# Patient Record
Sex: MALE | Race: WHITE | Employment: OTHER | URBAN - METROPOLITAN AREA
[De-identification: names, ages, dates, MRNs, and addresses within clinical notes are randomized per-mention and may not be internally consistent; named-entity substitution may affect disease eponyms.]

---

## 2024-04-06 ENCOUNTER — HOSPITAL ENCOUNTER (EMERGENCY)
Age: 74
Discharge: HOME | End: 2024-04-06
Attending: EMERGENCY MEDICINE
Payer: OTHER GOVERNMENT

## 2024-04-06 ENCOUNTER — APPOINTMENT (OUTPATIENT)
Dept: CT IMAGING | Age: 74
End: 2024-04-06
Payer: OTHER GOVERNMENT

## 2024-04-06 VITALS
OXYGEN SATURATION: 99 % | RESPIRATION RATE: 18 BRPM | TEMPERATURE: 97.5 F | DIASTOLIC BLOOD PRESSURE: 72 MMHG | SYSTOLIC BLOOD PRESSURE: 114 MMHG | HEART RATE: 89 BPM

## 2024-04-06 DIAGNOSIS — S09.90XA INJURY OF HEAD, INITIAL ENCOUNTER: Primary | ICD-10-CM

## 2024-04-06 PROCEDURE — 72125 CT NECK SPINE W/O DYE: CPT

## 2024-04-06 PROCEDURE — 70450 CT HEAD/BRAIN W/O DYE: CPT

## 2024-04-06 PROCEDURE — 90471 IMMUNIZATION ADMIN: CPT | Performed by: EMERGENCY MEDICINE

## 2024-04-06 PROCEDURE — 99284 EMERGENCY DEPT VISIT MOD MDM: CPT

## 2024-04-06 PROCEDURE — 90714 TD VACC NO PRESV 7 YRS+ IM: CPT | Performed by: EMERGENCY MEDICINE

## 2024-04-06 PROCEDURE — 6360000002 HC RX W HCPCS: Performed by: EMERGENCY MEDICINE

## 2024-04-06 RX ORDER — TETANUS AND DIPHTHERIA TOXOIDS ADSORBED 2; 2 [LF]/.5ML; [LF]/.5ML
0.5 INJECTION INTRAMUSCULAR ONCE
Status: COMPLETED | OUTPATIENT
Start: 2024-04-06 | End: 2024-04-06

## 2024-04-06 RX ADMIN — TETANUS AND DIPHTHERIA TOXOIDS ADSORBED 0.5 ML: 2; 2 INJECTION INTRAMUSCULAR at 12:32

## 2024-04-06 ASSESSMENT — PAIN SCALES - GENERAL: PAINLEVEL_OUTOF10: 3

## 2024-04-06 ASSESSMENT — PAIN - FUNCTIONAL ASSESSMENT: PAIN_FUNCTIONAL_ASSESSMENT: 0-10

## 2024-04-06 NOTE — ED PROVIDER NOTES
73-year-old male presenting with concern about a laceration of the back of his head.  He was carrying something in his house yesterday evening he reports.  He is not sure about what time it was.  He was carrying a glass of water and 1 hand and started to turn a corner, lost his balance and fell.  Struck the back of his head against an item of furniture.  Does have dried blood across laceration now, denied losing consciousness.  He is currently awake, alert, oriented x 4 not in any distress         History reviewed. No pertinent family history.  Past Surgical History:   Procedure Laterality Date    APPENDECTOMY      EYE SURGERY      bilateral cataracts    FOOT SURGERY      1-4-2013 mpj left foot    FRACTURE SURGERY      elbow       Review of Systems   HENT:          Laceration/abrasion to the back of the head   Skin:  Positive for wound.        Physical Exam  Constitutional:       General: He is not in acute distress.     Appearance: He is well-developed.   HENT:      Head: Normocephalic.      Comments: V-shaped laceration on the back of the left side of the occiput  Eyes:      Pupils: Pupils are equal, round, and reactive to light.   Neck:      Thyroid: No thyromegaly.   Cardiovascular:      Rate and Rhythm: Normal rate and regular rhythm.   Pulmonary:      Effort: Pulmonary effort is normal. No respiratory distress.      Breath sounds: Normal breath sounds. No wheezing.   Abdominal:      General: There is no distension.      Palpations: Abdomen is soft. There is no mass.      Tenderness: There is no abdominal tenderness. There is no guarding or rebound.   Musculoskeletal:         General: No tenderness. Normal range of motion.      Cervical back: Normal range of motion and neck supple.   Skin:     General: Skin is warm and dry.      Findings: No erythema.   Neurological:      Mental Status: He is alert and oriented to person, place, and time.      Cranial Nerves: No cranial nerve deficit.   Psychiatric:

## 2024-09-05 ENCOUNTER — HOSPITAL ENCOUNTER (OUTPATIENT)
Age: 74
Setting detail: OBSERVATION
Discharge: NURSING, REHAB OR LONG-TERM CARE | End: 2024-09-11
Attending: EMERGENCY MEDICINE | Admitting: INTERNAL MEDICINE
Payer: OTHER GOVERNMENT

## 2024-09-05 ENCOUNTER — APPOINTMENT (OUTPATIENT)
Dept: CT IMAGING | Age: 74
End: 2024-09-05
Payer: OTHER GOVERNMENT

## 2024-09-05 ENCOUNTER — APPOINTMENT (OUTPATIENT)
Dept: GENERAL RADIOLOGY | Age: 74
End: 2024-09-05
Payer: OTHER GOVERNMENT

## 2024-09-05 DIAGNOSIS — R62.7 FAILURE TO THRIVE IN ADULT: Primary | ICD-10-CM

## 2024-09-05 DIAGNOSIS — R29.6 FREQUENT FALLS: ICD-10-CM

## 2024-09-05 DIAGNOSIS — R53.83 OTHER FATIGUE: ICD-10-CM

## 2024-09-05 LAB
ALBUMIN SERPL-MCNC: 4.1 G/DL (ref 3.5–5.2)
ALP SERPL-CCNC: 307 U/L (ref 40–129)
ALT SERPL-CCNC: 66 U/L (ref 0–40)
ANION GAP SERPL CALCULATED.3IONS-SCNC: 12 MMOL/L (ref 7–16)
AST SERPL-CCNC: 67 U/L (ref 0–39)
B.E.: -2.3 MMOL/L (ref -3–3)
BASOPHILS # BLD: 0.03 K/UL (ref 0–0.2)
BASOPHILS NFR BLD: 1 % (ref 0–2)
BILIRUB SERPL-MCNC: 1.1 MG/DL (ref 0–1.2)
BILIRUB UR QL STRIP: NEGATIVE
BUN SERPL-MCNC: 23 MG/DL (ref 6–23)
CALCIUM SERPL-MCNC: 10.8 MG/DL (ref 8.6–10.2)
CHLORIDE SERPL-SCNC: 108 MMOL/L (ref 98–107)
CK SERPL-CCNC: 86 U/L (ref 20–200)
CLARITY UR: CLEAR
CO2 SERPL-SCNC: 25 MMOL/L (ref 22–29)
COHB: 0.5 % (ref 0–1.5)
COLOR UR: YELLOW
CREAT SERPL-MCNC: 1.6 MG/DL (ref 0.7–1.2)
CRITICAL: ABNORMAL
DATE ANALYZED: ABNORMAL
DATE OF COLLECTION: ABNORMAL
EOSINOPHIL # BLD: 0.14 K/UL (ref 0.05–0.5)
EOSINOPHILS RELATIVE PERCENT: 3 % (ref 0–6)
ERYTHROCYTE [DISTWIDTH] IN BLOOD BY AUTOMATED COUNT: 12.8 % (ref 11.5–15)
GFR, ESTIMATED: 44 ML/MIN/1.73M2
GLUCOSE BLD-MCNC: 110 MG/DL (ref 74–99)
GLUCOSE BLD-MCNC: 89 MG/DL (ref 74–99)
GLUCOSE SERPL-MCNC: 101 MG/DL (ref 74–99)
GLUCOSE UR STRIP-MCNC: NEGATIVE MG/DL
HCO3: 20.5 MMOL/L (ref 22–26)
HCT VFR BLD AUTO: 38.4 % (ref 37–54)
HGB BLD-MCNC: 13 G/DL (ref 12.5–16.5)
HGB UR QL STRIP.AUTO: NEGATIVE
HHB: 2.8 % (ref 0–5)
IMM GRANULOCYTES # BLD AUTO: 0.03 K/UL (ref 0–0.58)
IMM GRANULOCYTES NFR BLD: 1 % (ref 0–5)
KETONES UR STRIP-MCNC: NEGATIVE MG/DL
LAB: ABNORMAL
LEUKOCYTE ESTERASE UR QL STRIP: NEGATIVE
LYMPHOCYTES NFR BLD: 1.03 K/UL (ref 1.5–4)
LYMPHOCYTES RELATIVE PERCENT: 20 % (ref 20–42)
Lab: 1416
MCH RBC QN AUTO: 31 PG (ref 26–35)
MCHC RBC AUTO-ENTMCNC: 33.9 G/DL (ref 32–34.5)
MCV RBC AUTO: 91.6 FL (ref 80–99.9)
METHB: 0.1 % (ref 0–1.5)
MODE: ABNORMAL
MONOCYTES NFR BLD: 0.43 K/UL (ref 0.1–0.95)
MONOCYTES NFR BLD: 8 % (ref 2–12)
NEUTROPHILS NFR BLD: 68 % (ref 43–80)
NEUTS SEG NFR BLD: 3.48 K/UL (ref 1.8–7.3)
NITRITE UR QL STRIP: NEGATIVE
O2 CONTENT: 18.1 ML/DL
O2 SATURATION: 97.2 % (ref 92–98.5)
O2HB: 96.6 % (ref 94–97)
OPERATOR ID: 1821
PATIENT TEMP: 37 C
PCO2: 29.9 MMHG (ref 35–45)
PH BLOOD GAS: 7.46 (ref 7.35–7.45)
PH UR STRIP: 6 [PH] (ref 5–9)
PLATELET # BLD AUTO: 139 K/UL (ref 130–450)
PMV BLD AUTO: 12.2 FL (ref 7–12)
PO2: 88 MMHG (ref 75–100)
POTASSIUM SERPL-SCNC: 5.2 MMOL/L (ref 3.5–5)
PROT SERPL-MCNC: 7.4 G/DL (ref 6.4–8.3)
PROT UR STRIP-MCNC: 100 MG/DL
RBC # BLD AUTO: 4.19 M/UL (ref 3.8–5.8)
RBC #/AREA URNS HPF: ABNORMAL /HPF
SODIUM SERPL-SCNC: 145 MMOL/L (ref 132–146)
SOURCE, BLOOD GAS: ABNORMAL
SP GR UR STRIP: >1.03 (ref 1–1.03)
THB: 13.3 G/DL (ref 11.5–16.5)
TIME ANALYZED: 1427
UROBILINOGEN UR STRIP-ACNC: 0.2 EU/DL (ref 0–1)
WBC #/AREA URNS HPF: ABNORMAL /HPF
WBC OTHER # BLD: 5.1 K/UL (ref 4.5–11.5)

## 2024-09-05 PROCEDURE — 72125 CT NECK SPINE W/O DYE: CPT

## 2024-09-05 PROCEDURE — 82962 GLUCOSE BLOOD TEST: CPT

## 2024-09-05 PROCEDURE — G0378 HOSPITAL OBSERVATION PER HR: HCPCS

## 2024-09-05 PROCEDURE — 70450 CT HEAD/BRAIN W/O DYE: CPT

## 2024-09-05 PROCEDURE — 93005 ELECTROCARDIOGRAM TRACING: CPT | Performed by: EMERGENCY MEDICINE

## 2024-09-05 PROCEDURE — 6360000002 HC RX W HCPCS

## 2024-09-05 PROCEDURE — 36415 COLL VENOUS BLD VENIPUNCTURE: CPT

## 2024-09-05 PROCEDURE — 85025 COMPLETE CBC W/AUTO DIFF WBC: CPT

## 2024-09-05 PROCEDURE — 81001 URINALYSIS AUTO W/SCOPE: CPT

## 2024-09-05 PROCEDURE — 82550 ASSAY OF CK (CPK): CPT

## 2024-09-05 PROCEDURE — 99285 EMERGENCY DEPT VISIT HI MDM: CPT

## 2024-09-05 PROCEDURE — 80053 COMPREHEN METABOLIC PANEL: CPT

## 2024-09-05 PROCEDURE — 6370000000 HC RX 637 (ALT 250 FOR IP): Performed by: EMERGENCY MEDICINE

## 2024-09-05 PROCEDURE — 71046 X-RAY EXAM CHEST 2 VIEWS: CPT

## 2024-09-05 PROCEDURE — 82805 BLOOD GASES W/O2 SATURATION: CPT

## 2024-09-05 RX ORDER — PROCHLORPERAZINE EDISYLATE 5 MG/ML
10 INJECTION INTRAMUSCULAR; INTRAVENOUS EVERY 6 HOURS PRN
Status: DISCONTINUED | OUTPATIENT
Start: 2024-09-05 | End: 2024-09-07

## 2024-09-05 RX ORDER — INSULIN LISPRO 100 [IU]/ML
0-8 INJECTION, SOLUTION INTRAVENOUS; SUBCUTANEOUS
Status: DISCONTINUED | OUTPATIENT
Start: 2024-09-05 | End: 2024-09-09

## 2024-09-05 RX ORDER — ACETAMINOPHEN 325 MG/1
650 TABLET ORAL EVERY 6 HOURS PRN
Status: DISCONTINUED | OUTPATIENT
Start: 2024-09-05 | End: 2024-09-11 | Stop reason: HOSPADM

## 2024-09-05 RX ORDER — DEXTROSE MONOHYDRATE 100 MG/ML
INJECTION, SOLUTION INTRAVENOUS CONTINUOUS PRN
Status: DISCONTINUED | OUTPATIENT
Start: 2024-09-05 | End: 2024-09-11 | Stop reason: HOSPADM

## 2024-09-05 RX ORDER — HYDRALAZINE HYDROCHLORIDE 20 MG/ML
10 INJECTION INTRAMUSCULAR; INTRAVENOUS EVERY 4 HOURS PRN
Status: DISCONTINUED | OUTPATIENT
Start: 2024-09-05 | End: 2024-09-11 | Stop reason: HOSPADM

## 2024-09-05 RX ORDER — GLUCAGON 1 MG/ML
1 KIT INJECTION PRN
Status: DISCONTINUED | OUTPATIENT
Start: 2024-09-05 | End: 2024-09-11 | Stop reason: HOSPADM

## 2024-09-05 RX ORDER — LABETALOL HYDROCHLORIDE 5 MG/ML
10 INJECTION, SOLUTION INTRAVENOUS EVERY 4 HOURS PRN
Status: DISCONTINUED | OUTPATIENT
Start: 2024-09-05 | End: 2024-09-11 | Stop reason: HOSPADM

## 2024-09-05 RX ORDER — MECOBALAMIN 5000 MCG
5 TABLET,DISINTEGRATING ORAL NIGHTLY PRN
Status: DISCONTINUED | OUTPATIENT
Start: 2024-09-05 | End: 2024-09-11 | Stop reason: HOSPADM

## 2024-09-05 RX ORDER — PANTOPRAZOLE SODIUM 40 MG/1
40 TABLET, DELAYED RELEASE ORAL
Status: DISCONTINUED | OUTPATIENT
Start: 2024-09-06 | End: 2024-09-11 | Stop reason: HOSPADM

## 2024-09-05 RX ORDER — LISINOPRIL 10 MG/1
40 TABLET ORAL ONCE
Status: COMPLETED | OUTPATIENT
Start: 2024-09-05 | End: 2024-09-05

## 2024-09-05 RX ORDER — POTASSIUM CHLORIDE 7.45 MG/ML
10 INJECTION INTRAVENOUS PRN
Status: DISCONTINUED | OUTPATIENT
Start: 2024-09-05 | End: 2024-09-11 | Stop reason: HOSPADM

## 2024-09-05 RX ORDER — INSULIN LISPRO 100 [IU]/ML
0-4 INJECTION, SOLUTION INTRAVENOUS; SUBCUTANEOUS NIGHTLY
Status: DISCONTINUED | OUTPATIENT
Start: 2024-09-05 | End: 2024-09-09

## 2024-09-05 RX ORDER — POTASSIUM CHLORIDE 1500 MG/1
40 TABLET, EXTENDED RELEASE ORAL PRN
Status: DISCONTINUED | OUTPATIENT
Start: 2024-09-05 | End: 2024-09-11 | Stop reason: HOSPADM

## 2024-09-05 RX ORDER — MAGNESIUM SULFATE IN WATER 40 MG/ML
2000 INJECTION, SOLUTION INTRAVENOUS PRN
Status: DISCONTINUED | OUTPATIENT
Start: 2024-09-05 | End: 2024-09-11 | Stop reason: HOSPADM

## 2024-09-05 RX ADMIN — LISINOPRIL 40 MG: 10 TABLET ORAL at 18:50

## 2024-09-05 RX ADMIN — LABETALOL HYDROCHLORIDE 10 MG: 5 INJECTION INTRAVENOUS at 20:28

## 2024-09-05 ASSESSMENT — LIFESTYLE VARIABLES
HOW OFTEN DO YOU HAVE A DRINK CONTAINING ALCOHOL: NEVER
HOW MANY STANDARD DRINKS CONTAINING ALCOHOL DO YOU HAVE ON A TYPICAL DAY: PATIENT DOES NOT DRINK

## 2024-09-06 LAB
25(OH)D3 SERPL-MCNC: 13.7 NG/ML (ref 30–100)
ALBUMIN SERPL-MCNC: 3.8 G/DL (ref 3.5–5.2)
ALP SERPL-CCNC: 298 U/L (ref 40–129)
ALT SERPL-CCNC: 61 U/L (ref 0–40)
ANION GAP SERPL CALCULATED.3IONS-SCNC: 11 MMOL/L (ref 7–16)
AST SERPL-CCNC: 52 U/L (ref 0–39)
BASOPHILS # BLD: 0.05 K/UL (ref 0–0.2)
BASOPHILS NFR BLD: 1 % (ref 0–2)
BILIRUB SERPL-MCNC: 0.9 MG/DL (ref 0–1.2)
BUN SERPL-MCNC: 25 MG/DL (ref 6–23)
CALCIUM SERPL-MCNC: 10 MG/DL (ref 8.6–10.2)
CHLORIDE SERPL-SCNC: 104 MMOL/L (ref 98–107)
CHOLEST SERPL-MCNC: 273 MG/DL
CO2 SERPL-SCNC: 24 MMOL/L (ref 22–29)
CREAT SERPL-MCNC: 1.6 MG/DL (ref 0.7–1.2)
EOSINOPHIL # BLD: 0.2 K/UL (ref 0.05–0.5)
EOSINOPHILS RELATIVE PERCENT: 3 % (ref 0–6)
ERYTHROCYTE [DISTWIDTH] IN BLOOD BY AUTOMATED COUNT: 13 % (ref 11.5–15)
FOLATE SERPL-MCNC: 8.1 NG/ML (ref 4.8–24.2)
GFR, ESTIMATED: 44 ML/MIN/1.73M2
GLUCOSE BLD-MCNC: 105 MG/DL (ref 74–99)
GLUCOSE BLD-MCNC: 110 MG/DL (ref 74–99)
GLUCOSE BLD-MCNC: 87 MG/DL (ref 74–99)
GLUCOSE BLD-MCNC: 97 MG/DL (ref 74–99)
GLUCOSE SERPL-MCNC: 142 MG/DL (ref 74–99)
HBA1C MFR BLD: 5.4 % (ref 4–5.6)
HCT VFR BLD AUTO: 36.2 % (ref 37–54)
HDLC SERPL-MCNC: 33 MG/DL
HGB BLD-MCNC: 12.2 G/DL (ref 12.5–16.5)
IMM GRANULOCYTES # BLD AUTO: <0.03 K/UL (ref 0–0.58)
IMM GRANULOCYTES NFR BLD: 0 % (ref 0–5)
IRON SATN MFR SERPL: 16 % (ref 20–55)
IRON SERPL-MCNC: 56 UG/DL (ref 59–158)
LDLC SERPL CALC-MCNC: 193 MG/DL
LYMPHOCYTES NFR BLD: 1.61 K/UL (ref 1.5–4)
LYMPHOCYTES RELATIVE PERCENT: 27 % (ref 20–42)
MCH RBC QN AUTO: 31.4 PG (ref 26–35)
MCHC RBC AUTO-ENTMCNC: 33.7 G/DL (ref 32–34.5)
MCV RBC AUTO: 93.1 FL (ref 80–99.9)
MONOCYTES NFR BLD: 0.46 K/UL (ref 0.1–0.95)
MONOCYTES NFR BLD: 8 % (ref 2–12)
NEUTROPHILS NFR BLD: 60 % (ref 43–80)
NEUTS SEG NFR BLD: 3.54 K/UL (ref 1.8–7.3)
PLATELET # BLD AUTO: 146 K/UL (ref 130–450)
PMV BLD AUTO: 12.1 FL (ref 7–12)
POTASSIUM SERPL-SCNC: 3.8 MMOL/L (ref 3.5–5)
PROT SERPL-MCNC: 6.6 G/DL (ref 6.4–8.3)
PSA SERPL-MCNC: 67.75 NG/ML (ref 0–4)
RBC # BLD AUTO: 3.89 M/UL (ref 3.8–5.8)
SODIUM SERPL-SCNC: 139 MMOL/L (ref 132–146)
T4 FREE SERPL-MCNC: 1.3 NG/DL (ref 0.9–1.7)
TIBC SERPL-MCNC: 346 UG/DL (ref 250–450)
TRIGL SERPL-MCNC: 237 MG/DL
TROPONIN I SERPL HS-MCNC: 26 NG/L (ref 0–11)
TSH SERPL DL<=0.05 MIU/L-ACNC: 1.73 UIU/ML (ref 0.27–4.2)
VIT B12 SERPL-MCNC: 324 PG/ML (ref 211–946)
VLDLC SERPL CALC-MCNC: 47 MG/DL
WBC OTHER # BLD: 5.9 K/UL (ref 4.5–11.5)

## 2024-09-06 PROCEDURE — 83036 HEMOGLOBIN GLYCOSYLATED A1C: CPT

## 2024-09-06 PROCEDURE — 6360000002 HC RX W HCPCS

## 2024-09-06 PROCEDURE — 84439 ASSAY OF FREE THYROXINE: CPT

## 2024-09-06 PROCEDURE — 97161 PT EVAL LOW COMPLEX 20 MIN: CPT

## 2024-09-06 PROCEDURE — 6370000000 HC RX 637 (ALT 250 FOR IP): Performed by: INTERNAL MEDICINE

## 2024-09-06 PROCEDURE — 84484 ASSAY OF TROPONIN QUANT: CPT

## 2024-09-06 PROCEDURE — G0103 PSA SCREENING: HCPCS

## 2024-09-06 PROCEDURE — 82962 GLUCOSE BLOOD TEST: CPT

## 2024-09-06 PROCEDURE — 82306 VITAMIN D 25 HYDROXY: CPT

## 2024-09-06 PROCEDURE — 82746 ASSAY OF FOLIC ACID SERUM: CPT

## 2024-09-06 PROCEDURE — 6360000002 HC RX W HCPCS: Performed by: INTERNAL MEDICINE

## 2024-09-06 PROCEDURE — 80053 COMPREHEN METABOLIC PANEL: CPT

## 2024-09-06 PROCEDURE — 83550 IRON BINDING TEST: CPT

## 2024-09-06 PROCEDURE — 82607 VITAMIN B-12: CPT

## 2024-09-06 PROCEDURE — G0378 HOSPITAL OBSERVATION PER HR: HCPCS

## 2024-09-06 PROCEDURE — 36415 COLL VENOUS BLD VENIPUNCTURE: CPT

## 2024-09-06 PROCEDURE — 85025 COMPLETE CBC W/AUTO DIFF WBC: CPT

## 2024-09-06 PROCEDURE — 83540 ASSAY OF IRON: CPT

## 2024-09-06 PROCEDURE — 84443 ASSAY THYROID STIM HORMONE: CPT

## 2024-09-06 PROCEDURE — 80061 LIPID PANEL: CPT

## 2024-09-06 RX ORDER — AMLODIPINE BESYLATE 5 MG/1
5 TABLET ORAL DAILY
Status: DISCONTINUED | OUTPATIENT
Start: 2024-09-06 | End: 2024-09-07

## 2024-09-06 RX ORDER — SODIUM CHLORIDE AND POTASSIUM CHLORIDE 150; 900 MG/100ML; MG/100ML
INJECTION, SOLUTION INTRAVENOUS CONTINUOUS
Status: DISPENSED | OUTPATIENT
Start: 2024-09-06 | End: 2024-09-07

## 2024-09-06 RX ADMIN — HYDRALAZINE HYDROCHLORIDE 10 MG: 20 INJECTION INTRAMUSCULAR; INTRAVENOUS at 19:40

## 2024-09-06 RX ADMIN — AMLODIPINE BESYLATE 5 MG: 5 TABLET ORAL at 09:20

## 2024-09-06 RX ADMIN — POTASSIUM CHLORIDE AND SODIUM CHLORIDE: 900; 150 INJECTION, SOLUTION INTRAVENOUS at 09:19

## 2024-09-06 RX ADMIN — POTASSIUM CHLORIDE AND SODIUM CHLORIDE: 900; 150 INJECTION, SOLUTION INTRAVENOUS at 19:42

## 2024-09-07 LAB
ALBUMIN SERPL-MCNC: 3.6 G/DL (ref 3.5–5.2)
ALP SERPL-CCNC: 294 U/L (ref 40–129)
ALT SERPL-CCNC: 54 U/L (ref 0–40)
ANION GAP SERPL CALCULATED.3IONS-SCNC: 11 MMOL/L (ref 7–16)
AST SERPL-CCNC: 47 U/L (ref 0–39)
BASOPHILS # BLD: 0.05 K/UL (ref 0–0.2)
BASOPHILS NFR BLD: 1 % (ref 0–2)
BILIRUB SERPL-MCNC: 0.8 MG/DL (ref 0–1.2)
BUN SERPL-MCNC: 16 MG/DL (ref 6–23)
CALCIUM SERPL-MCNC: 9.8 MG/DL (ref 8.6–10.2)
CHLORIDE SERPL-SCNC: 108 MMOL/L (ref 98–107)
CO2 SERPL-SCNC: 23 MMOL/L (ref 22–29)
CREAT SERPL-MCNC: 1.4 MG/DL (ref 0.7–1.2)
EKG ATRIAL RATE: 67 BPM
EKG P AXIS: 23 DEGREES
EKG P-R INTERVAL: 136 MS
EKG Q-T INTERVAL: 396 MS
EKG QRS DURATION: 84 MS
EKG QTC CALCULATION (BAZETT): 418 MS
EKG T AXIS: -42 DEGREES
EKG VENTRICULAR RATE: 67 BPM
EOSINOPHIL # BLD: 0.31 K/UL (ref 0.05–0.5)
EOSINOPHILS RELATIVE PERCENT: 6 % (ref 0–6)
ERYTHROCYTE [DISTWIDTH] IN BLOOD BY AUTOMATED COUNT: 12.9 % (ref 11.5–15)
GFR, ESTIMATED: 52 ML/MIN/1.73M2
GLUCOSE BLD-MCNC: 100 MG/DL (ref 74–99)
GLUCOSE BLD-MCNC: 100 MG/DL (ref 74–99)
GLUCOSE BLD-MCNC: 103 MG/DL (ref 74–99)
GLUCOSE BLD-MCNC: 92 MG/DL (ref 74–99)
GLUCOSE SERPL-MCNC: 92 MG/DL (ref 74–99)
HCT VFR BLD AUTO: 34.5 % (ref 37–54)
HGB BLD-MCNC: 11.6 G/DL (ref 12.5–16.5)
IMM GRANULOCYTES # BLD AUTO: <0.03 K/UL (ref 0–0.58)
IMM GRANULOCYTES NFR BLD: 0 % (ref 0–5)
LYMPHOCYTES NFR BLD: 1.75 K/UL (ref 1.5–4)
LYMPHOCYTES RELATIVE PERCENT: 33 % (ref 20–42)
MCH RBC QN AUTO: 31.2 PG (ref 26–35)
MCHC RBC AUTO-ENTMCNC: 33.6 G/DL (ref 32–34.5)
MCV RBC AUTO: 92.7 FL (ref 80–99.9)
MONOCYTES NFR BLD: 0.57 K/UL (ref 0.1–0.95)
MONOCYTES NFR BLD: 11 % (ref 2–12)
NEUTROPHILS NFR BLD: 49 % (ref 43–80)
NEUTS SEG NFR BLD: 2.55 K/UL (ref 1.8–7.3)
PLATELET # BLD AUTO: 129 K/UL (ref 130–450)
PMV BLD AUTO: 11.9 FL (ref 7–12)
POTASSIUM SERPL-SCNC: 3.7 MMOL/L (ref 3.5–5)
PROT SERPL-MCNC: 6.2 G/DL (ref 6.4–8.3)
RBC # BLD AUTO: 3.72 M/UL (ref 3.8–5.8)
SODIUM SERPL-SCNC: 142 MMOL/L (ref 132–146)
WBC OTHER # BLD: 5.3 K/UL (ref 4.5–11.5)

## 2024-09-07 PROCEDURE — 97530 THERAPEUTIC ACTIVITIES: CPT | Performed by: OCCUPATIONAL THERAPIST

## 2024-09-07 PROCEDURE — 85025 COMPLETE CBC W/AUTO DIFF WBC: CPT

## 2024-09-07 PROCEDURE — 93010 ELECTROCARDIOGRAM REPORT: CPT | Performed by: INTERNAL MEDICINE

## 2024-09-07 PROCEDURE — G0378 HOSPITAL OBSERVATION PER HR: HCPCS

## 2024-09-07 PROCEDURE — 6360000002 HC RX W HCPCS

## 2024-09-07 PROCEDURE — 82962 GLUCOSE BLOOD TEST: CPT

## 2024-09-07 PROCEDURE — 36415 COLL VENOUS BLD VENIPUNCTURE: CPT

## 2024-09-07 PROCEDURE — 97165 OT EVAL LOW COMPLEX 30 MIN: CPT | Performed by: OCCUPATIONAL THERAPIST

## 2024-09-07 PROCEDURE — 80053 COMPREHEN METABOLIC PANEL: CPT

## 2024-09-07 PROCEDURE — 6370000000 HC RX 637 (ALT 250 FOR IP): Performed by: INTERNAL MEDICINE

## 2024-09-07 RX ORDER — AMLODIPINE BESYLATE 10 MG/1
10 TABLET ORAL DAILY
Status: DISCONTINUED | OUTPATIENT
Start: 2024-09-08 | End: 2024-09-11 | Stop reason: HOSPADM

## 2024-09-07 RX ORDER — PROCHLORPERAZINE EDISYLATE 5 MG/ML
5 INJECTION INTRAMUSCULAR; INTRAVENOUS EVERY 6 HOURS PRN
Status: DISCONTINUED | OUTPATIENT
Start: 2024-09-07 | End: 2024-09-11 | Stop reason: HOSPADM

## 2024-09-07 RX ADMIN — AMLODIPINE BESYLATE 5 MG: 5 TABLET ORAL at 08:31

## 2024-09-07 RX ADMIN — HYDRALAZINE HYDROCHLORIDE 10 MG: 20 INJECTION INTRAMUSCULAR; INTRAVENOUS at 06:31

## 2024-09-08 LAB
ALBUMIN SERPL-MCNC: 4.1 G/DL (ref 3.5–5.2)
ALP SERPL-CCNC: 338 U/L (ref 40–129)
ALT SERPL-CCNC: 58 U/L (ref 0–40)
ANION GAP SERPL CALCULATED.3IONS-SCNC: 13 MMOL/L (ref 7–16)
AST SERPL-CCNC: 51 U/L (ref 0–39)
BASOPHILS # BLD: 0.09 K/UL (ref 0–0.2)
BASOPHILS NFR BLD: 1 % (ref 0–2)
BILIRUB SERPL-MCNC: 1.3 MG/DL (ref 0–1.2)
BUN SERPL-MCNC: 16 MG/DL (ref 6–23)
CALCIUM SERPL-MCNC: 10.2 MG/DL (ref 8.6–10.2)
CHLORIDE SERPL-SCNC: 106 MMOL/L (ref 98–107)
CO2 SERPL-SCNC: 21 MMOL/L (ref 22–29)
CREAT SERPL-MCNC: 1.7 MG/DL (ref 0.7–1.2)
EOSINOPHIL # BLD: 0.47 K/UL (ref 0.05–0.5)
EOSINOPHILS RELATIVE PERCENT: 7 % (ref 0–6)
ERYTHROCYTE [DISTWIDTH] IN BLOOD BY AUTOMATED COUNT: 13.2 % (ref 11.5–15)
GFR, ESTIMATED: 43 ML/MIN/1.73M2
GLUCOSE SERPL-MCNC: 96 MG/DL (ref 74–99)
HCT VFR BLD AUTO: 38.8 % (ref 37–54)
HGB BLD-MCNC: 13.1 G/DL (ref 12.5–16.5)
IMM GRANULOCYTES # BLD AUTO: 0.03 K/UL (ref 0–0.58)
IMM GRANULOCYTES NFR BLD: 0 % (ref 0–5)
LYMPHOCYTES NFR BLD: 2.19 K/UL (ref 1.5–4)
LYMPHOCYTES RELATIVE PERCENT: 31 % (ref 20–42)
MCH RBC QN AUTO: 31.1 PG (ref 26–35)
MCHC RBC AUTO-ENTMCNC: 33.8 G/DL (ref 32–34.5)
MCV RBC AUTO: 92.2 FL (ref 80–99.9)
MONOCYTES NFR BLD: 0.55 K/UL (ref 0.1–0.95)
MONOCYTES NFR BLD: 8 % (ref 2–12)
NEUTROPHILS NFR BLD: 54 % (ref 43–80)
NEUTS SEG NFR BLD: 3.84 K/UL (ref 1.8–7.3)
PLATELET # BLD AUTO: 179 K/UL (ref 130–450)
PMV BLD AUTO: 11.7 FL (ref 7–12)
POTASSIUM SERPL-SCNC: 3.9 MMOL/L (ref 3.5–5)
PROT SERPL-MCNC: 7.1 G/DL (ref 6.4–8.3)
RBC # BLD AUTO: 4.21 M/UL (ref 3.8–5.8)
SODIUM SERPL-SCNC: 140 MMOL/L (ref 132–146)
WBC OTHER # BLD: 7.2 K/UL (ref 4.5–11.5)

## 2024-09-08 PROCEDURE — 80053 COMPREHEN METABOLIC PANEL: CPT

## 2024-09-08 PROCEDURE — 51798 US URINE CAPACITY MEASURE: CPT

## 2024-09-08 PROCEDURE — 6370000000 HC RX 637 (ALT 250 FOR IP)

## 2024-09-08 PROCEDURE — 6370000000 HC RX 637 (ALT 250 FOR IP): Performed by: INTERNAL MEDICINE

## 2024-09-08 PROCEDURE — 85025 COMPLETE CBC W/AUTO DIFF WBC: CPT

## 2024-09-08 PROCEDURE — 36415 COLL VENOUS BLD VENIPUNCTURE: CPT

## 2024-09-08 PROCEDURE — G0378 HOSPITAL OBSERVATION PER HR: HCPCS

## 2024-09-08 PROCEDURE — 6360000002 HC RX W HCPCS: Performed by: INTERNAL MEDICINE

## 2024-09-08 RX ORDER — ATORVASTATIN CALCIUM 40 MG/1
40 TABLET, FILM COATED ORAL NIGHTLY
Status: DISCONTINUED | OUTPATIENT
Start: 2024-09-08 | End: 2024-09-11 | Stop reason: HOSPADM

## 2024-09-08 RX ORDER — SODIUM CHLORIDE AND POTASSIUM CHLORIDE 150; 900 MG/100ML; MG/100ML
INJECTION, SOLUTION INTRAVENOUS CONTINUOUS
Status: DISCONTINUED | OUTPATIENT
Start: 2024-09-08 | End: 2024-09-10

## 2024-09-08 RX ADMIN — POTASSIUM CHLORIDE AND SODIUM CHLORIDE: 900; 150 INJECTION, SOLUTION INTRAVENOUS at 22:01

## 2024-09-08 RX ADMIN — AMLODIPINE BESYLATE 10 MG: 10 TABLET ORAL at 09:21

## 2024-09-08 RX ADMIN — POTASSIUM CHLORIDE AND SODIUM CHLORIDE: 900; 150 INJECTION, SOLUTION INTRAVENOUS at 11:04

## 2024-09-08 RX ADMIN — Medication 5 MG: at 22:02

## 2024-09-08 RX ADMIN — ATORVASTATIN CALCIUM 40 MG: 40 TABLET, FILM COATED ORAL at 22:02

## 2024-09-09 LAB
ALBUMIN SERPL-MCNC: 3.5 G/DL (ref 3.5–5.2)
ALP SERPL-CCNC: 266 U/L (ref 40–129)
ALT SERPL-CCNC: 45 U/L (ref 0–40)
ANION GAP SERPL CALCULATED.3IONS-SCNC: 13 MMOL/L (ref 7–16)
AST SERPL-CCNC: 42 U/L (ref 0–39)
BASOPHILS # BLD: 0.04 K/UL (ref 0–0.2)
BASOPHILS NFR BLD: 1 % (ref 0–2)
BILIRUB SERPL-MCNC: 0.9 MG/DL (ref 0–1.2)
BUN SERPL-MCNC: 12 MG/DL (ref 6–23)
CALCIUM SERPL-MCNC: 9.6 MG/DL (ref 8.6–10.2)
CHLORIDE SERPL-SCNC: 107 MMOL/L (ref 98–107)
CO2 SERPL-SCNC: 21 MMOL/L (ref 22–29)
CREAT SERPL-MCNC: 1.5 MG/DL (ref 0.7–1.2)
EOSINOPHIL # BLD: 0.38 K/UL (ref 0.05–0.5)
EOSINOPHILS RELATIVE PERCENT: 8 % (ref 0–6)
ERYTHROCYTE [DISTWIDTH] IN BLOOD BY AUTOMATED COUNT: 12.9 % (ref 11.5–15)
GFR, ESTIMATED: 51 ML/MIN/1.73M2
GLUCOSE SERPL-MCNC: 96 MG/DL (ref 74–99)
HCT VFR BLD AUTO: 32.7 % (ref 37–54)
HGB BLD-MCNC: 11.1 G/DL (ref 12.5–16.5)
IMM GRANULOCYTES # BLD AUTO: <0.03 K/UL (ref 0–0.58)
IMM GRANULOCYTES NFR BLD: 0 % (ref 0–5)
LYMPHOCYTES NFR BLD: 1.62 K/UL (ref 1.5–4)
LYMPHOCYTES RELATIVE PERCENT: 34 % (ref 20–42)
MCH RBC QN AUTO: 31.4 PG (ref 26–35)
MCHC RBC AUTO-ENTMCNC: 33.9 G/DL (ref 32–34.5)
MCV RBC AUTO: 92.4 FL (ref 80–99.9)
MONOCYTES NFR BLD: 0.47 K/UL (ref 0.1–0.95)
MONOCYTES NFR BLD: 10 % (ref 2–12)
NEUTROPHILS NFR BLD: 47 % (ref 43–80)
NEUTS SEG NFR BLD: 2.27 K/UL (ref 1.8–7.3)
PLATELET, FLUORESCENCE: 132 K/UL (ref 130–450)
PMV BLD AUTO: 12.1 FL (ref 7–12)
POTASSIUM SERPL-SCNC: 3.9 MMOL/L (ref 3.5–5)
PROT SERPL-MCNC: 6 G/DL (ref 6.4–8.3)
RBC # BLD AUTO: 3.54 M/UL (ref 3.8–5.8)
SODIUM SERPL-SCNC: 141 MMOL/L (ref 132–146)
WBC OTHER # BLD: 4.8 K/UL (ref 4.5–11.5)

## 2024-09-09 PROCEDURE — 97530 THERAPEUTIC ACTIVITIES: CPT

## 2024-09-09 PROCEDURE — G0378 HOSPITAL OBSERVATION PER HR: HCPCS

## 2024-09-09 PROCEDURE — 85025 COMPLETE CBC W/AUTO DIFF WBC: CPT

## 2024-09-09 PROCEDURE — 80053 COMPREHEN METABOLIC PANEL: CPT

## 2024-09-09 PROCEDURE — 36415 COLL VENOUS BLD VENIPUNCTURE: CPT

## 2024-09-09 PROCEDURE — 6370000000 HC RX 637 (ALT 250 FOR IP)

## 2024-09-09 PROCEDURE — 6370000000 HC RX 637 (ALT 250 FOR IP): Performed by: INTERNAL MEDICINE

## 2024-09-09 RX ADMIN — ATORVASTATIN CALCIUM 40 MG: 40 TABLET, FILM COATED ORAL at 20:16

## 2024-09-09 RX ADMIN — AMLODIPINE BESYLATE 10 MG: 10 TABLET ORAL at 08:02

## 2024-09-09 RX ADMIN — Medication 5 MG: at 20:16

## 2024-09-10 ENCOUNTER — APPOINTMENT (OUTPATIENT)
Dept: ULTRASOUND IMAGING | Age: 74
End: 2024-09-10
Payer: OTHER GOVERNMENT

## 2024-09-10 LAB
ALBUMIN SERPL-MCNC: 3.6 G/DL (ref 3.5–5.2)
ALP SERPL-CCNC: 287 U/L (ref 40–129)
ALT SERPL-CCNC: 44 U/L (ref 0–40)
ANION GAP SERPL CALCULATED.3IONS-SCNC: 10 MMOL/L (ref 7–16)
AST SERPL-CCNC: 40 U/L (ref 0–39)
BASOPHILS # BLD: 0.02 K/UL (ref 0–0.2)
BASOPHILS NFR BLD: 1 % (ref 0–2)
BILIRUB SERPL-MCNC: 0.9 MG/DL (ref 0–1.2)
BUN SERPL-MCNC: 10 MG/DL (ref 6–23)
CALCIUM SERPL-MCNC: 9.6 MG/DL (ref 8.6–10.2)
CHLORIDE SERPL-SCNC: 107 MMOL/L (ref 98–107)
CO2 SERPL-SCNC: 22 MMOL/L (ref 22–29)
CREAT SERPL-MCNC: 1.6 MG/DL (ref 0.7–1.2)
EOSINOPHIL # BLD: 0.23 K/UL (ref 0.05–0.5)
EOSINOPHILS RELATIVE PERCENT: 6 % (ref 0–6)
ERYTHROCYTE [DISTWIDTH] IN BLOOD BY AUTOMATED COUNT: 13.1 % (ref 11.5–15)
GFR, ESTIMATED: 44 ML/MIN/1.73M2
GLUCOSE SERPL-MCNC: 95 MG/DL (ref 74–99)
HCT VFR BLD AUTO: 33.4 % (ref 37–54)
HGB BLD-MCNC: 11.1 G/DL (ref 12.5–16.5)
IMM GRANULOCYTES # BLD AUTO: <0.03 K/UL (ref 0–0.58)
IMM GRANULOCYTES NFR BLD: 1 % (ref 0–5)
LYMPHOCYTES NFR BLD: 0.8 K/UL (ref 1.5–4)
LYMPHOCYTES RELATIVE PERCENT: 19 % (ref 20–42)
MCH RBC QN AUTO: 30.7 PG (ref 26–35)
MCHC RBC AUTO-ENTMCNC: 33.2 G/DL (ref 32–34.5)
MCV RBC AUTO: 92.5 FL (ref 80–99.9)
MONOCYTES NFR BLD: 0.45 K/UL (ref 0.1–0.95)
MONOCYTES NFR BLD: 11 % (ref 2–12)
NEUTROPHILS NFR BLD: 63 % (ref 43–80)
NEUTS SEG NFR BLD: 2.61 K/UL (ref 1.8–7.3)
PLATELET # BLD AUTO: 118 K/UL (ref 130–450)
PMV BLD AUTO: 12 FL (ref 7–12)
POTASSIUM SERPL-SCNC: 4.2 MMOL/L (ref 3.5–5)
PROT SERPL-MCNC: 6.1 G/DL (ref 6.4–8.3)
RBC # BLD AUTO: 3.61 M/UL (ref 3.8–5.8)
SODIUM SERPL-SCNC: 139 MMOL/L (ref 132–146)
WBC OTHER # BLD: 4.1 K/UL (ref 4.5–11.5)

## 2024-09-10 PROCEDURE — G0378 HOSPITAL OBSERVATION PER HR: HCPCS

## 2024-09-10 PROCEDURE — 85025 COMPLETE CBC W/AUTO DIFF WBC: CPT

## 2024-09-10 PROCEDURE — 76705 ECHO EXAM OF ABDOMEN: CPT

## 2024-09-10 PROCEDURE — 80053 COMPREHEN METABOLIC PANEL: CPT

## 2024-09-10 PROCEDURE — 6370000000 HC RX 637 (ALT 250 FOR IP): Performed by: INTERNAL MEDICINE

## 2024-09-10 PROCEDURE — 36415 COLL VENOUS BLD VENIPUNCTURE: CPT

## 2024-09-10 RX ORDER — ZIPRASIDONE MESYLATE 20 MG/ML
5 INJECTION, POWDER, LYOPHILIZED, FOR SOLUTION INTRAMUSCULAR ONCE
Status: COMPLETED | OUTPATIENT
Start: 2024-09-11 | End: 2024-09-11

## 2024-09-10 RX ORDER — METOPROLOL SUCCINATE 25 MG/1
25 TABLET, EXTENDED RELEASE ORAL DAILY
Status: DISCONTINUED | OUTPATIENT
Start: 2024-09-10 | End: 2024-09-11 | Stop reason: HOSPADM

## 2024-09-10 RX ADMIN — METOPROLOL SUCCINATE 25 MG: 25 TABLET, EXTENDED RELEASE ORAL at 09:46

## 2024-09-10 RX ADMIN — AMLODIPINE BESYLATE 10 MG: 10 TABLET ORAL at 08:03

## 2024-09-11 VITALS
DIASTOLIC BLOOD PRESSURE: 70 MMHG | WEIGHT: 156.2 LBS | BODY MASS INDEX: 25.1 KG/M2 | RESPIRATION RATE: 18 BRPM | HEART RATE: 92 BPM | SYSTOLIC BLOOD PRESSURE: 124 MMHG | HEIGHT: 66 IN | TEMPERATURE: 98.6 F | OXYGEN SATURATION: 97 %

## 2024-09-11 LAB
ALBUMIN SERPL-MCNC: 3.7 G/DL (ref 3.5–5.2)
ALP SERPL-CCNC: 272 U/L (ref 40–129)
ALT SERPL-CCNC: 40 U/L (ref 0–40)
ANION GAP SERPL CALCULATED.3IONS-SCNC: 12 MMOL/L (ref 7–16)
AST SERPL-CCNC: 41 U/L (ref 0–39)
BASOPHILS # BLD: 0.03 K/UL (ref 0–0.2)
BASOPHILS NFR BLD: 1 % (ref 0–2)
BILIRUB SERPL-MCNC: 1 MG/DL (ref 0–1.2)
BUN SERPL-MCNC: 14 MG/DL (ref 6–23)
CALCIUM SERPL-MCNC: 9.7 MG/DL (ref 8.6–10.2)
CHLORIDE SERPL-SCNC: 102 MMOL/L (ref 98–107)
CO2 SERPL-SCNC: 23 MMOL/L (ref 22–29)
CREAT SERPL-MCNC: 1.7 MG/DL (ref 0.7–1.2)
EOSINOPHIL # BLD: 0.11 K/UL (ref 0.05–0.5)
EOSINOPHILS RELATIVE PERCENT: 2 % (ref 0–6)
ERYTHROCYTE [DISTWIDTH] IN BLOOD BY AUTOMATED COUNT: 13.1 % (ref 11.5–15)
GFR, ESTIMATED: 42 ML/MIN/1.73M2
GLUCOSE SERPL-MCNC: 87 MG/DL (ref 74–99)
HCT VFR BLD AUTO: 33.6 % (ref 37–54)
HGB BLD-MCNC: 11.3 G/DL (ref 12.5–16.5)
IMM GRANULOCYTES # BLD AUTO: <0.03 K/UL (ref 0–0.58)
IMM GRANULOCYTES NFR BLD: 0 % (ref 0–5)
LYMPHOCYTES NFR BLD: 1.23 K/UL (ref 1.5–4)
LYMPHOCYTES RELATIVE PERCENT: 22 % (ref 20–42)
MCH RBC QN AUTO: 30.9 PG (ref 26–35)
MCHC RBC AUTO-ENTMCNC: 33.6 G/DL (ref 32–34.5)
MCV RBC AUTO: 91.8 FL (ref 80–99.9)
MONOCYTES NFR BLD: 0.61 K/UL (ref 0.1–0.95)
MONOCYTES NFR BLD: 11 % (ref 2–12)
NEUTROPHILS NFR BLD: 64 % (ref 43–80)
NEUTS SEG NFR BLD: 3.51 K/UL (ref 1.8–7.3)
PLATELET # BLD AUTO: 132 K/UL (ref 130–450)
PMV BLD AUTO: 12 FL (ref 7–12)
POTASSIUM SERPL-SCNC: 4 MMOL/L (ref 3.5–5)
PROT SERPL-MCNC: 6.4 G/DL (ref 6.4–8.3)
RBC # BLD AUTO: 3.66 M/UL (ref 3.8–5.8)
SODIUM SERPL-SCNC: 137 MMOL/L (ref 132–146)
WBC OTHER # BLD: 5.5 K/UL (ref 4.5–11.5)

## 2024-09-11 PROCEDURE — 6360000002 HC RX W HCPCS: Performed by: INTERNAL MEDICINE

## 2024-09-11 PROCEDURE — 6370000000 HC RX 637 (ALT 250 FOR IP)

## 2024-09-11 PROCEDURE — G0378 HOSPITAL OBSERVATION PER HR: HCPCS

## 2024-09-11 PROCEDURE — 36415 COLL VENOUS BLD VENIPUNCTURE: CPT

## 2024-09-11 PROCEDURE — 85025 COMPLETE CBC W/AUTO DIFF WBC: CPT

## 2024-09-11 PROCEDURE — 80053 COMPREHEN METABOLIC PANEL: CPT

## 2024-09-11 PROCEDURE — 6370000000 HC RX 637 (ALT 250 FOR IP): Performed by: INTERNAL MEDICINE

## 2024-09-11 RX ORDER — PANTOPRAZOLE SODIUM 40 MG/1
40 TABLET, DELAYED RELEASE ORAL
DISCHARGE
Start: 2024-09-11

## 2024-09-11 RX ORDER — ATORVASTATIN CALCIUM 40 MG/1
40 TABLET, FILM COATED ORAL NIGHTLY
DISCHARGE
Start: 2024-09-11

## 2024-09-11 RX ORDER — AMLODIPINE BESYLATE 10 MG/1
10 TABLET ORAL DAILY
DISCHARGE
Start: 2024-09-11

## 2024-09-11 RX ORDER — HALOPERIDOL 5 MG/ML
2 INJECTION INTRAMUSCULAR EVERY 4 HOURS PRN
Status: DISCONTINUED | OUTPATIENT
Start: 2024-09-11 | End: 2024-09-11 | Stop reason: HOSPADM

## 2024-09-11 RX ORDER — METOPROLOL SUCCINATE 25 MG/1
25 TABLET, EXTENDED RELEASE ORAL DAILY
DISCHARGE
Start: 2024-09-11

## 2024-09-11 RX ORDER — MECOBALAMIN 5000 MCG
5 TABLET,DISINTEGRATING ORAL NIGHTLY
DISCHARGE
Start: 2024-09-11

## 2024-09-11 RX ORDER — DIVALPROEX SODIUM 125 MG/1
125 CAPSULE, COATED PELLETS ORAL EVERY 8 HOURS SCHEDULED
Status: DISCONTINUED | OUTPATIENT
Start: 2024-09-11 | End: 2024-09-11 | Stop reason: HOSPADM

## 2024-09-11 RX ORDER — DIVALPROEX SODIUM 125 MG/1
125 TABLET, DELAYED RELEASE ORAL 3 TIMES DAILY
DISCHARGE
Start: 2024-09-11

## 2024-09-11 RX ADMIN — DIVALPROEX SODIUM 125 MG: 125 CAPSULE ORAL at 14:59

## 2024-09-11 RX ADMIN — ZIPRASIDONE MESYLATE 5 MG: 20 INJECTION, POWDER, LYOPHILIZED, FOR SOLUTION INTRAMUSCULAR at 00:18

## 2024-09-11 RX ADMIN — METOPROLOL SUCCINATE 25 MG: 25 TABLET, EXTENDED RELEASE ORAL at 09:43

## 2024-09-11 RX ADMIN — HALOPERIDOL LACTATE 2 MG: 5 INJECTION, SOLUTION INTRAMUSCULAR at 09:39

## 2024-09-11 RX ADMIN — AMLODIPINE BESYLATE 10 MG: 10 TABLET ORAL at 09:43

## 2024-09-11 RX ADMIN — PANTOPRAZOLE SODIUM 40 MG: 40 TABLET, DELAYED RELEASE ORAL at 09:43

## 2024-10-29 ENCOUNTER — OUTSIDE SERVICES (OUTPATIENT)
Dept: INTERNAL MEDICINE CLINIC | Age: 74
End: 2024-10-29

## 2024-10-29 DIAGNOSIS — R62.7 ADULT FAILURE TO THRIVE: Primary | ICD-10-CM

## 2024-10-29 DIAGNOSIS — D50.9 IRON DEFICIENCY ANEMIA, UNSPECIFIED IRON DEFICIENCY ANEMIA TYPE: ICD-10-CM

## 2024-10-29 DIAGNOSIS — E78.5 HYPERLIPIDEMIA, UNSPECIFIED HYPERLIPIDEMIA TYPE: ICD-10-CM

## 2024-10-29 DIAGNOSIS — E11.9 TYPE 2 DIABETES MELLITUS WITHOUT COMPLICATION, UNSPECIFIED WHETHER LONG TERM INSULIN USE (HCC): ICD-10-CM

## 2024-10-29 DIAGNOSIS — I10 ESSENTIAL (PRIMARY) HYPERTENSION: ICD-10-CM

## 2024-10-29 DIAGNOSIS — K21.9 GASTROESOPHAGEAL REFLUX DISEASE WITHOUT ESOPHAGITIS: ICD-10-CM

## 2024-10-29 DIAGNOSIS — C61 MALIGNANT NEOPLASM OF PROSTATE (HCC): ICD-10-CM

## 2024-10-29 DIAGNOSIS — M62.81 MUSCLE WEAKNESS (GENERALIZED): ICD-10-CM

## 2024-10-29 DIAGNOSIS — C79.51 BONE NEOPLASM SECONDARY (HCC): ICD-10-CM

## 2024-10-29 DIAGNOSIS — M10.9 GOUT, UNSPECIFIED CAUSE, UNSPECIFIED CHRONICITY, UNSPECIFIED SITE: ICD-10-CM

## 2024-10-29 DIAGNOSIS — R29.6 REPEATED FALLS: ICD-10-CM

## 2024-12-08 VITALS
TEMPERATURE: 98.1 F | DIASTOLIC BLOOD PRESSURE: 68 MMHG | RESPIRATION RATE: 16 BRPM | HEART RATE: 68 BPM | SYSTOLIC BLOOD PRESSURE: 146 MMHG | OXYGEN SATURATION: 98 % | WEIGHT: 127 LBS | BODY MASS INDEX: 20.5 KG/M2

## 2024-12-08 NOTE — PROGRESS NOTES
Visit Date: 10/29/2024  Ba Landeros  1950  male 74 y.o.      Subjective:    CC: Patient presents with no current complaints.     HPI: The patient is a 74-year-old gentleman with a past medical history of coronary artery disease, hypertension, hyperlipidemia, diabetes. He has a diagnosis of failure to thrive and prostate cancer with mets.   The patient is seen and examined. Medications on the chart reviewed. Labs are reviewed. Discussed with nursing staff.       ROS:  Review of Systems   Constitutional:  Negative for chills, fatigue, fever and unexpected weight change.   HENT:  Negative for congestion, postnasal drip, rhinorrhea, sore throat, trouble swallowing and voice change.    Eyes:  Negative for photophobia, pain and visual disturbance.   Respiratory:  Negative for cough, chest tightness, shortness of breath and wheezing.    Cardiovascular:  Negative for chest pain and palpitations.   Gastrointestinal:  Negative for abdominal pain, blood in stool, constipation, diarrhea, nausea and vomiting.   Endocrine: Negative for heat intolerance, polydipsia and polyuria.   Genitourinary:  Negative for difficulty urinating, dysuria, frequency, hematuria and urgency.   Musculoskeletal:  Negative for arthralgias, back pain, neck pain and neck stiffness.   Skin:  Negative for pallor.   Neurological: Negative.  Negative for dizziness and light-headedness.   Hematological:  Does not bruise/bleed easily.        Current Meds: Refer to nursing home record    PMH:    Medical Problems: reviewed and updated; refer to nursing home record       Diagnosis Date    Diabetes mellitus (HCC)     Hyperlipidemia     Hypertension         Surgical Hx: reviewed and updated; refer to nursing home record   Past Surgical History:   Procedure Laterality Date    APPENDECTOMY      EYE SURGERY      bilateral cataracts    FOOT SURGERY      1-4-2013 mpj left foot    FRACTURE SURGERY      elbow        FH: reviewed and updated; refer to nursing

## 2024-12-17 ENCOUNTER — OUTSIDE SERVICES (OUTPATIENT)
Dept: INTERNAL MEDICINE CLINIC | Age: 74
End: 2024-12-17
Payer: OTHER GOVERNMENT

## 2024-12-17 DIAGNOSIS — E78.5 HYPERLIPIDEMIA, UNSPECIFIED HYPERLIPIDEMIA TYPE: ICD-10-CM

## 2024-12-17 DIAGNOSIS — M10.9 GOUT, UNSPECIFIED CAUSE, UNSPECIFIED CHRONICITY, UNSPECIFIED SITE: ICD-10-CM

## 2024-12-17 DIAGNOSIS — K21.9 GASTROESOPHAGEAL REFLUX DISEASE WITHOUT ESOPHAGITIS: ICD-10-CM

## 2024-12-17 DIAGNOSIS — R29.6 REPEATED FALLS: ICD-10-CM

## 2024-12-17 DIAGNOSIS — E11.9 TYPE 2 DIABETES MELLITUS WITHOUT COMPLICATION, UNSPECIFIED WHETHER LONG TERM INSULIN USE (HCC): ICD-10-CM

## 2024-12-17 DIAGNOSIS — D50.9 IRON DEFICIENCY ANEMIA, UNSPECIFIED IRON DEFICIENCY ANEMIA TYPE: ICD-10-CM

## 2024-12-17 DIAGNOSIS — M62.81 MUSCLE WEAKNESS (GENERALIZED): ICD-10-CM

## 2024-12-17 DIAGNOSIS — I10 ESSENTIAL (PRIMARY) HYPERTENSION: ICD-10-CM

## 2024-12-17 DIAGNOSIS — C79.51 BONE NEOPLASM SECONDARY (HCC): ICD-10-CM

## 2024-12-17 DIAGNOSIS — R62.7 ADULT FAILURE TO THRIVE: Primary | ICD-10-CM

## 2024-12-17 DIAGNOSIS — C61 MALIGNANT NEOPLASM OF PROSTATE (HCC): ICD-10-CM

## 2024-12-17 PROCEDURE — 99308 SBSQ NF CARE LOW MDM 20: CPT | Performed by: INTERNAL MEDICINE

## 2024-12-17 ASSESSMENT — ENCOUNTER SYMPTOMS
ABDOMINAL PAIN: 0
WHEEZING: 0
RHINORRHEA: 0
VOICE CHANGE: 0
CONSTIPATION: 0
DIARRHEA: 0
TROUBLE SWALLOWING: 0
BLOOD IN STOOL: 0
EYE PAIN: 0
SHORTNESS OF BREATH: 0
SORE THROAT: 0
CHEST TIGHTNESS: 0
PHOTOPHOBIA: 0
COUGH: 0
BACK PAIN: 0
NAUSEA: 0
VOMITING: 0

## 2024-12-17 NOTE — PROGRESS NOTES
Visit Date: 12/17/2024  Ba Landeros  1950  male 74 y.o.      Subjective:    CC: Patient presents with no current complaints.     HPI: The patient is a 74-year-old gentleman with a past medical history of coronary artery disease, hypertension, hyperlipidemia, diabetes. He has a diagnosis of failure to thrive and prostate cancer with mets.   The patient is seen and examined. Medications on the chart reviewed. Labs are reviewed. Discussed with nursing staff.       ROS:  Review of Systems   Constitutional:  Negative for chills, fatigue, fever and unexpected weight change.   HENT:  Negative for congestion, postnasal drip, rhinorrhea, sore throat, trouble swallowing and voice change.    Eyes:  Negative for photophobia, pain and visual disturbance.   Respiratory:  Negative for cough, chest tightness, shortness of breath and wheezing.    Cardiovascular:  Negative for chest pain and palpitations.   Gastrointestinal:  Negative for abdominal pain, blood in stool, constipation, diarrhea, nausea and vomiting.   Endocrine: Negative for heat intolerance, polydipsia and polyuria.   Genitourinary:  Negative for difficulty urinating, dysuria, frequency, hematuria and urgency.   Musculoskeletal:  Negative for arthralgias, back pain, neck pain and neck stiffness.   Skin:  Negative for pallor.   Neurological: Negative.  Negative for dizziness and light-headedness.   Hematological:  Does not bruise/bleed easily.        Current Meds: Refer to nursing home record    PMH:    Medical Problems: reviewed and updated; refer to nursing home record       Diagnosis Date    Diabetes mellitus (HCC)     Hyperlipidemia     Hypertension         Surgical Hx: reviewed and updated; refer to nursing home record   Past Surgical History:   Procedure Laterality Date    APPENDECTOMY      EYE SURGERY      bilateral cataracts    FOOT SURGERY      1-4-2013 mpj left foot    FRACTURE SURGERY      elbow        FH: reviewed and updated; refer to nursing

## 2025-04-22 ENCOUNTER — OUTSIDE SERVICES (OUTPATIENT)
Dept: INTERNAL MEDICINE CLINIC | Age: 75
End: 2025-04-22
Payer: MEDICARE

## 2025-04-22 DIAGNOSIS — E78.5 HYPERLIPIDEMIA, UNSPECIFIED HYPERLIPIDEMIA TYPE: ICD-10-CM

## 2025-04-22 DIAGNOSIS — M10.9 GOUT, UNSPECIFIED CAUSE, UNSPECIFIED CHRONICITY, UNSPECIFIED SITE: ICD-10-CM

## 2025-04-22 DIAGNOSIS — K21.9 GASTROESOPHAGEAL REFLUX DISEASE WITHOUT ESOPHAGITIS: ICD-10-CM

## 2025-04-22 DIAGNOSIS — E11.9 TYPE 2 DIABETES MELLITUS WITHOUT COMPLICATION, UNSPECIFIED WHETHER LONG TERM INSULIN USE (HCC): ICD-10-CM

## 2025-04-22 DIAGNOSIS — D50.9 IRON DEFICIENCY ANEMIA, UNSPECIFIED IRON DEFICIENCY ANEMIA TYPE: ICD-10-CM

## 2025-04-22 DIAGNOSIS — R62.7 ADULT FAILURE TO THRIVE: Primary | ICD-10-CM

## 2025-04-22 DIAGNOSIS — R29.6 REPEATED FALLS: ICD-10-CM

## 2025-04-22 DIAGNOSIS — C79.51 BONE NEOPLASM SECONDARY (HCC): ICD-10-CM

## 2025-04-22 DIAGNOSIS — I10 ESSENTIAL (PRIMARY) HYPERTENSION: ICD-10-CM

## 2025-04-22 DIAGNOSIS — C61 MALIGNANT NEOPLASM OF PROSTATE (HCC): ICD-10-CM

## 2025-04-22 PROCEDURE — 99308 SBSQ NF CARE LOW MDM 20: CPT | Performed by: INTERNAL MEDICINE

## 2025-04-22 ASSESSMENT — ENCOUNTER SYMPTOMS
CONSTIPATION: 0
COUGH: 0
SORE THROAT: 0
PHOTOPHOBIA: 0
SHORTNESS OF BREATH: 0
VOMITING: 0
DIARRHEA: 0
BACK PAIN: 0
VOICE CHANGE: 0
TROUBLE SWALLOWING: 0
ABDOMINAL PAIN: 0
CHEST TIGHTNESS: 0
EYE PAIN: 0
RHINORRHEA: 0
NAUSEA: 0
WHEEZING: 0
BLOOD IN STOOL: 0

## 2025-04-22 NOTE — PROGRESS NOTES
Visit Date: 4/22/2024  Ba Landeros  1950  male 74 y.o.      Subjective:    CC: Patient presents with no current complaints.     HPI: The patient is a 74-year-old gentleman with a past medical history of coronary artery disease, hypertension, hyperlipidemia, diabetes. He has a diagnosis of failure to thrive and prostate cancer with mets.   The patient is seen and examined. Medications on the chart reviewed. Labs are reviewed. Discussed with nursing staff.       ROS:  Review of Systems   Constitutional:  Negative for chills, fatigue, fever and unexpected weight change.   HENT:  Negative for congestion, postnasal drip, rhinorrhea, sore throat, trouble swallowing and voice change.    Eyes:  Negative for photophobia, pain and visual disturbance.   Respiratory:  Negative for cough, chest tightness, shortness of breath and wheezing.    Cardiovascular:  Negative for chest pain and palpitations.   Gastrointestinal:  Negative for abdominal pain, blood in stool, constipation, diarrhea, nausea and vomiting.   Endocrine: Negative for heat intolerance, polydipsia and polyuria.   Genitourinary:  Negative for difficulty urinating, dysuria, frequency, hematuria and urgency.   Musculoskeletal:  Negative for arthralgias, back pain, neck pain and neck stiffness.   Skin:  Negative for pallor.   Neurological: Negative.  Negative for dizziness and light-headedness.   Hematological:  Does not bruise/bleed easily.        Current Meds: Refer to nursing home record    PMH:    Medical Problems: reviewed and updated; refer to nursing home record       Diagnosis Date    Diabetes mellitus (HCC)     Hyperlipidemia     Hypertension         Surgical Hx: reviewed and updated; refer to nursing home record   Past Surgical History:   Procedure Laterality Date    APPENDECTOMY      EYE SURGERY      bilateral cataracts    FOOT SURGERY      1-4-2013 mpj left foot    FRACTURE SURGERY      elbow        FH: reviewed and updated; refer to nursing